# Patient Record
Sex: FEMALE | Race: ASIAN | NOT HISPANIC OR LATINO | Employment: UNEMPLOYED | URBAN - METROPOLITAN AREA
[De-identification: names, ages, dates, MRNs, and addresses within clinical notes are randomized per-mention and may not be internally consistent; named-entity substitution may affect disease eponyms.]

---

## 2020-09-20 ENCOUNTER — HOSPITAL ENCOUNTER (EMERGENCY)
Facility: CLINIC | Age: 40
Discharge: HOME | End: 2020-09-20
Attending: FAMILY MEDICINE | Admitting: FAMILY MEDICINE
Payer: COMMERCIAL

## 2020-09-20 VITALS
OXYGEN SATURATION: 96 % | HEART RATE: 96 BPM | WEIGHT: 123.6 LBS | TEMPERATURE: 98.9 F | SYSTOLIC BLOOD PRESSURE: 104 MMHG | DIASTOLIC BLOOD PRESSURE: 74 MMHG | RESPIRATION RATE: 18 BRPM

## 2020-09-20 DIAGNOSIS — A74.9 CHLAMYDIA INFECTION: ICD-10-CM

## 2020-09-20 DIAGNOSIS — N73.0 PID (ACUTE PELVIC INFLAMMATORY DISEASE): ICD-10-CM

## 2020-09-20 LAB
SPECIMEN SOURCE: ABNORMAL
WET PREP SPEC: ABNORMAL

## 2020-09-20 PROCEDURE — 25000125 ZZHC RX 250: Performed by: FAMILY MEDICINE

## 2020-09-20 PROCEDURE — 87210 SMEAR WET MOUNT SALINE/INK: CPT | Performed by: FAMILY MEDICINE

## 2020-09-20 PROCEDURE — 25000132 ZZH RX MED GY IP 250 OP 250 PS 637: Performed by: FAMILY MEDICINE

## 2020-09-20 PROCEDURE — 87591 N.GONORRHOEAE DNA AMP PROB: CPT | Performed by: FAMILY MEDICINE

## 2020-09-20 PROCEDURE — 96372 THER/PROPH/DIAG INJ SC/IM: CPT | Performed by: FAMILY MEDICINE

## 2020-09-20 PROCEDURE — 87177 OVA AND PARASITES SMEARS: CPT | Performed by: FAMILY MEDICINE

## 2020-09-20 PROCEDURE — 87491 CHLMYD TRACH DNA AMP PROBE: CPT | Performed by: FAMILY MEDICINE

## 2020-09-20 PROCEDURE — 99284 EMERGENCY DEPT VISIT MOD MDM: CPT | Mod: 25 | Performed by: FAMILY MEDICINE

## 2020-09-20 PROCEDURE — 87209 SMEAR COMPLEX STAIN: CPT | Performed by: FAMILY MEDICINE

## 2020-09-20 PROCEDURE — 25000128 H RX IP 250 OP 636: Performed by: FAMILY MEDICINE

## 2020-09-20 PROCEDURE — 87506 IADNA-DNA/RNA PROBE TQ 6-11: CPT | Performed by: FAMILY MEDICINE

## 2020-09-20 PROCEDURE — 99284 EMERGENCY DEPT VISIT MOD MDM: CPT | Mod: Z6 | Performed by: FAMILY MEDICINE

## 2020-09-20 RX ORDER — AZITHROMYCIN 250 MG/1
1000 TABLET, FILM COATED ORAL ONCE
Status: COMPLETED | OUTPATIENT
Start: 2020-09-20 | End: 2020-09-20

## 2020-09-20 RX ADMIN — AZITHROMYCIN 1000 MG: 250 TABLET, FILM COATED ORAL at 15:46

## 2020-09-20 RX ADMIN — LIDOCAINE HYDROCHLORIDE 250 MG: 10 INJECTION, SOLUTION EPIDURAL; INFILTRATION; INTRACAUDAL; PERINEURAL at 15:46

## 2020-09-20 SDOH — HEALTH STABILITY: MENTAL HEALTH: HOW OFTEN DO YOU HAVE A DRINK CONTAINING ALCOHOL?: NEVER

## 2020-09-20 ASSESSMENT — ENCOUNTER SYMPTOMS
NAUSEA: 1
DIARRHEA: 1
ABDOMINAL PAIN: 1
APPETITE CHANGE: 1

## 2020-09-20 NOTE — DISCHARGE INSTRUCTIONS
Discharge to home follow-up on your culture results in 2 days return if increased abdominal pain vomiting dehydration or fevers.

## 2020-09-20 NOTE — ED PROVIDER NOTES
ED Provider Note  [facility]      History     Chief Complaint   Patient presents with     Abdominal Pain     States she thinks she was exposed to possible clamydia. Complains of abd pain.     The history is provided by the patient and medical records.     Prema Valenzuela is a 40 year old female who presents to the Emergency Department with abdominal pain. Patient reports she has lower abdominal pain and thinks she has PID again. She states she has had a new partner for the past month. Patient reports she was seen at [facility] 2 weeks ago for similar pain and was treated for PID. Per patient's report she also had chlamydia and gonorrhea from her partner. She states she was treated and he was not and they have continued to engage in sexual activity. Patient reports she just finished her menstrual period and has continued to have spotting the past 2 days which is abnormal for her. She states she had nausea, diarrhea, and was dry-heaving last night. Patient has also not been able to get comfortable in bed and has had decreased appetite.    Past Medical History  History reviewed. No pertinent past medical history.  History reviewed. No pertinent surgical history.  No current outpatient medications on file.    Allergies   Allergen Reactions     Zofran [Ondansetron] Hives     Alcohol      Ibuprofen      Family History  Family History   Adopted: Yes   Problem Relation Age of Onset     Family History Negative No family hx of         pt is adopted     Social History   Social History     Tobacco Use     Smoking status: Current Every Day Smoker     Packs/day: 0.30     Years: 13.00     Pack years: 3.90     Types: Cigarettes     Smokeless tobacco: Never Used   Substance Use Topics     Alcohol use: Not Currently     Frequency: Never     Comment: rare     Drug use: No      Past medical history, past surgical history, medications, allergies, family history, and social history were reviewed with the patient.  No additional pertinent items.       Review of Systems   Constitutional: Positive for appetite change (decrease).   Gastrointestinal: Positive for abdominal pain (lower), diarrhea and nausea.   All other systems reviewed and are negative.    A complete review of systems was performed with pertinent positives and negatives noted in the HPI, and all other systems negative.    Physical Exam   BP: 104/74  Pulse: 109  Temp: 99.6  F (37.6  C)(pt denied cough,CP and SOB.)  Resp: 18  Weight: 56.1 kg (123 lb 9.6 oz)  SpO2: 100 %  Physical Exam  Constitutional:       General: She is not in acute distress.     Appearance: She is not diaphoretic.   HENT:      Head: Atraumatic.      Mouth/Throat:      Pharynx: No oropharyngeal exudate.   Eyes:      General: No scleral icterus.     Pupils: Pupils are equal, round, and reactive to light.   Cardiovascular:      Heart sounds: Normal heart sounds.   Pulmonary:      Effort: No respiratory distress.      Breath sounds: Normal breath sounds.   Abdominal:      General: Bowel sounds are normal.      Palpations: Abdomen is soft.      Tenderness: There is no abdominal tenderness.   Genitourinary:     Vagina: Vaginal discharge present.      Cervix: Cervical motion tenderness present.      Uterus: Tender.       Adnexa:         Right: Tenderness present. No mass.          Left: Tenderness present. No mass.     Musculoskeletal:         General: No tenderness.   Skin:     General: Skin is warm.      Findings: No rash.       ED Course      Procedures    Patient was given 250 IM Rocephin and 1 g of Zithromax p.o. she then became somewhat nauseated followed by several watery diarrheal stools she was able to take p.o. without difficulty we did send stool for stool cultures otherwise she will follow-up on her culture results in 2 days.         Assessments & Plan (with Medical Decision Making)       I have reviewed the nursing notes. I have reviewed the findings, diagnosis, plan and need for follow up  with the patient.    Patient with likely PID from reinfection likely chlamydia at this time was treated with antibiotics does have loose stools however does not appear to be grossly dehydrated was taking fluids and has a nonsurgical abdomen there were no rebound or significant guarding on examination she does have tenderness on pelvic examination consistent with PID.  Patient will be discharged to home with stool cultures as well as [facility] cultures pending will be following up with her primary MD in 2 days or return to the emergency room if she has any marked increase in pain or fevers.    Final diagnoses:   PID (acute pelvic inflammatory disease)   Chlamydia infection     ITami, am serving as a trained medical scribe to document services personally performed by Vivek Mejia MD, based on the provider's statements to me.      IVviek MD, was physically present and have reviewed and verified the accuracy of this note documented by Tami Fang    --  Vivek Mejia MD  [facility], [place], EMERGENCY DEPARTMENT  9/20/2020     Vivek Mejia MD  09/20/20 1921

## 2020-09-20 NOTE — ED AVS SNAPSHOT
[facility], [place], Emergency Department  [address]  [place] [address]  Phone:  [phone]  Fax:  [phone]                                    Prema Valenzuela   MRN: [id number]    Department:  [facility], Emergency Department   Date of Visit:  9/20/2020           After Visit Summary Signature Page    I have received my discharge instructions, and my questions have been answered. I have discussed any challenges I see with this plan with the nurse or doctor.    ..........................................................................................................................................  Patient/Patient Representative Signature      ..........................................................................................................................................  Patient Representative Print Name and Relationship to Patient    ..................................................               ................................................  Date                                   Time    ..........................................................................................................................................  Reviewed by Signature/Title    ...................................................              ..............................................  Date                                               Time          22EPIC Rev 08/18

## 2020-09-20 NOTE — ED NOTES
Pt has had three rounds of diarrhea since administration of ABx.  Pt feeling a little nauseus and would like to wait a few more minutes before discharge, Room tided a bit and MD notifed

## 2020-09-21 LAB
C COLI+JEJUNI+LARI FUSA STL QL NAA+PROBE: NOT DETECTED
C TRACH DNA SPEC QL NAA+PROBE: NEGATIVE
EC STX1 GENE STL QL NAA+PROBE: NOT DETECTED
EC STX2 GENE STL QL NAA+PROBE: NOT DETECTED
ENTERIC PATHOGEN COMMENT: NORMAL
N GONORRHOEA DNA SPEC QL NAA+PROBE: NEGATIVE
NOROV GI+II ORF1-ORF2 JNC STL QL NAA+PR: NOT DETECTED
O+P STL MICRO: NORMAL
O+P STL MICRO: NORMAL
RVA NSP5 STL QL NAA+PROBE: NOT DETECTED
SALMONELLA SP RPOD STL QL NAA+PROBE: NOT DETECTED
SHIGELLA SP+EIEC IPAH STL QL NAA+PROBE: NOT DETECTED
SPECIMEN SOURCE: NORMAL
V CHOL+PARA RFBL+TRKH+TNAA STL QL NAA+PR: NOT DETECTED
Y ENTERO RECN STL QL NAA+PROBE: NOT DETECTED

## 2020-09-21 NOTE — RESULT ENCOUNTER NOTE
Final result for both N. Gonorrhoeae PCR and Chlamydia Trachomatis PCR are NEGATIVE.  No treatment or change in treatment per [facility] ED Lab Result protocol.

## 2020-09-21 NOTE — RESULT ENCOUNTER NOTE
Final Enteric Bacteria and Virus Panel by [facility] Stool is NEGATIVE for Campylobacter group, Salmonella species, Shigella species, Shiga toxin 1 gene, Shiga toxin 2 gene, Vibrio group,  Yersinia enterocolitica,  Rotavirus A,  and Norovirus I and II.    No treatment or change in treatment per [facility] Lab Result protocol.

## 2022-08-10 ENCOUNTER — APPOINTMENT (OUTPATIENT)
Dept: ULTRASOUND IMAGING | Facility: CLINIC | Age: 42
End: 2022-08-10
Attending: INTERNAL MEDICINE
Payer: COMMERCIAL

## 2022-08-10 ENCOUNTER — HOSPITAL ENCOUNTER (EMERGENCY)
Facility: CLINIC | Age: 42
Discharge: HOME | End: 2022-08-10
Attending: INTERNAL MEDICINE | Admitting: INTERNAL MEDICINE
Payer: COMMERCIAL

## 2022-08-10 VITALS
RESPIRATION RATE: 18 BRPM | OXYGEN SATURATION: 96 % | HEART RATE: 74 BPM | WEIGHT: 101 LBS | TEMPERATURE: 99.1 F | BODY MASS INDEX: 19.83 KG/M2 | SYSTOLIC BLOOD PRESSURE: 124 MMHG | DIASTOLIC BLOOD PRESSURE: 88 MMHG | HEIGHT: 60 IN

## 2022-08-10 DIAGNOSIS — N83.202 CYSTS OF BOTH OVARIES: ICD-10-CM

## 2022-08-10 DIAGNOSIS — N93.9 VAGINAL BLEEDING: ICD-10-CM

## 2022-08-10 DIAGNOSIS — D25.9 UTERINE LEIOMYOMA, UNSPECIFIED LOCATION: ICD-10-CM

## 2022-08-10 DIAGNOSIS — N83.201 CYSTS OF BOTH OVARIES: ICD-10-CM

## 2022-08-10 LAB
ABO/RH(D): NORMAL
ALBUMIN SERPL-MCNC: 3.6 G/DL (ref 3.4–5)
ALBUMIN UR-MCNC: NEGATIVE MG/DL
ALP SERPL-CCNC: 67 U/L (ref 40–150)
ALT SERPL W P-5'-P-CCNC: 14 U/L (ref 0–50)
AMORPH CRY #/AREA URNS HPF: ABNORMAL /HPF
ANION GAP SERPL CALCULATED.3IONS-SCNC: 3 MMOL/L (ref 3–14)
ANTIBODY SCREEN: NEGATIVE
APPEARANCE UR: ABNORMAL
AST SERPL W P-5'-P-CCNC: 10 U/L (ref 0–45)
B-HCG SERPL-ACNC: <1 IU/L (ref 0–5)
BASOPHILS # BLD AUTO: 0 10E3/UL (ref 0–0.2)
BASOPHILS NFR BLD AUTO: 1 %
BILIRUB SERPL-MCNC: 0.8 MG/DL (ref 0.2–1.3)
BILIRUB UR QL STRIP: NEGATIVE
BUN SERPL-MCNC: 10 MG/DL (ref 7–30)
CALCIUM SERPL-MCNC: 9 MG/DL (ref 8.5–10.1)
CHLORIDE BLD-SCNC: 108 MMOL/L (ref 94–109)
CO2 SERPL-SCNC: 28 MMOL/L (ref 20–32)
COLOR UR AUTO: ABNORMAL
CREAT SERPL-MCNC: 0.65 MG/DL (ref 0.52–1.04)
EOSINOPHIL # BLD AUTO: 0.1 10E3/UL (ref 0–0.7)
EOSINOPHIL NFR BLD AUTO: 1 %
ERYTHROCYTE [DISTWIDTH] IN BLOOD BY AUTOMATED COUNT: 13.1 % (ref 10–15)
GFR SERPL CREATININE-BSD FRML MDRD: >90 ML/MIN/1.73M2
GLUCOSE BLD-MCNC: 262 MG/DL (ref 70–99)
GLUCOSE UR STRIP-MCNC: 500 MG/DL
HCG UR QL: NEGATIVE
HCT VFR BLD AUTO: 41.5 % (ref 35–47)
HGB BLD-MCNC: 14.2 G/DL (ref 11.7–15.7)
HGB UR QL STRIP: NEGATIVE
IMM GRANULOCYTES # BLD: 0 10E3/UL
IMM GRANULOCYTES NFR BLD: 1 %
INR PPP: 1.07 (ref 0.85–1.15)
KETONES UR STRIP-MCNC: NEGATIVE MG/DL
LEUKOCYTE ESTERASE UR QL STRIP: NEGATIVE
LYMPHOCYTES # BLD AUTO: 1.6 10E3/UL (ref 0.8–5.3)
LYMPHOCYTES NFR BLD AUTO: 19 %
MCH RBC QN AUTO: 30 PG (ref 26.5–33)
MCHC RBC AUTO-ENTMCNC: 34.2 G/DL (ref 31.5–36.5)
MCV RBC AUTO: 88 FL (ref 78–100)
MONOCYTES # BLD AUTO: 0.4 10E3/UL (ref 0–1.3)
MONOCYTES NFR BLD AUTO: 5 %
MUCOUS THREADS #/AREA URNS LPF: PRESENT /LPF
NEUTROPHILS # BLD AUTO: 6.3 10E3/UL (ref 1.6–8.3)
NEUTROPHILS NFR BLD AUTO: 73 %
NITRATE UR QL: NEGATIVE
NRBC # BLD AUTO: 0 10E3/UL
NRBC BLD AUTO-RTO: 0 /100
PH UR STRIP: 6.5 [PH] (ref 5–7)
PLATELET # BLD AUTO: 281 10E3/UL (ref 150–450)
POTASSIUM BLD-SCNC: 3.7 MMOL/L (ref 3.4–5.3)
PROT SERPL-MCNC: 7 G/DL (ref 6.8–8.8)
RBC # BLD AUTO: 4.73 10E6/UL (ref 3.8–5.2)
RBC URINE: 1 /HPF
SODIUM SERPL-SCNC: 139 MMOL/L (ref 133–144)
SP GR UR STRIP: 1.02 (ref 1–1.03)
SPECIMEN EXPIRATION DATE: NORMAL
SQUAMOUS EPITHELIAL: 2 /HPF
UROBILINOGEN UR STRIP-MCNC: NORMAL MG/DL
WBC # BLD AUTO: 8.4 10E3/UL (ref 4–11)
WBC URINE: <1 /HPF

## 2022-08-10 PROCEDURE — 80053 COMPREHEN METABOLIC PANEL: CPT | Performed by: INTERNAL MEDICINE

## 2022-08-10 PROCEDURE — 84702 CHORIONIC GONADOTROPIN TEST: CPT | Performed by: INTERNAL MEDICINE

## 2022-08-10 PROCEDURE — 93976 VASCULAR STUDY: CPT

## 2022-08-10 PROCEDURE — 85025 COMPLETE CBC W/AUTO DIFF WBC: CPT | Performed by: INTERNAL MEDICINE

## 2022-08-10 PROCEDURE — 81025 URINE PREGNANCY TEST: CPT | Performed by: INTERNAL MEDICINE

## 2022-08-10 PROCEDURE — 99285 EMERGENCY DEPT VISIT HI MDM: CPT | Mod: 25 | Performed by: INTERNAL MEDICINE

## 2022-08-10 PROCEDURE — 81003 URINALYSIS AUTO W/O SCOPE: CPT | Performed by: INTERNAL MEDICINE

## 2022-08-10 PROCEDURE — 82040 ASSAY OF SERUM ALBUMIN: CPT | Performed by: INTERNAL MEDICINE

## 2022-08-10 PROCEDURE — 36415 COLL VENOUS BLD VENIPUNCTURE: CPT | Performed by: INTERNAL MEDICINE

## 2022-08-10 PROCEDURE — 99284 EMERGENCY DEPT VISIT MOD MDM: CPT | Mod: 25 | Performed by: INTERNAL MEDICINE

## 2022-08-10 PROCEDURE — 76815 OB US LIMITED FETUS(S): CPT | Performed by: INTERNAL MEDICINE

## 2022-08-10 PROCEDURE — 85610 PROTHROMBIN TIME: CPT | Performed by: INTERNAL MEDICINE

## 2022-08-10 PROCEDURE — 86850 RBC ANTIBODY SCREEN: CPT | Performed by: INTERNAL MEDICINE

## 2022-08-10 PROCEDURE — 76815 OB US LIMITED FETUS(S): CPT | Mod: 26 | Performed by: INTERNAL MEDICINE

## 2022-08-10 ASSESSMENT — ENCOUNTER SYMPTOMS
FEVER: 0
HEADACHES: 0
SHORTNESS OF BREATH: 0
NECK STIFFNESS: 0
NERVOUS/ANXIOUS: 1
CONSTIPATION: 1
ARTHRALGIAS: 0
NAUSEA: 1
COLOR CHANGE: 0
DIFFICULTY URINATING: 0
ABDOMINAL PAIN: 1
EYE REDNESS: 0
CONFUSION: 0

## 2022-08-10 ASSESSMENT — ACTIVITIES OF DAILY LIVING (ADL)
ADLS_ACUITY_SCORE: 35
ADLS_ACUITY_SCORE: 33

## 2022-08-10 NOTE — ED TRIAGE NOTES
"\"I'm having pregnancy symptoms and spotting.\" Pt is 41yo F bibs c/o spotting x4 days and possible pregnancy. She denies all other complaints. Missed period on 7/21/22.  Pt c/o anxiety and exhibits pressured speech and provides many details about hx of pregnancy and miscarriage. She appears in stable condition, pt is oriented to ED evaluation process.      Triage Assessment     Row Name 08/10/22 2033       Triage Assessment (Adult)    Airway WDL WDL       Respiratory WDL    Respiratory WDL WDL       Skin Circulation/Temperature WDL    Skin Circulation/Temperature WDL WDL       Cardiac WDL    Cardiac WDL WDL       Peripheral/Neurovascular WDL    Peripheral Neurovascular WDL WDL       Cognitive/Neuro/Behavioral WDL    Cognitive/Neuro/Behavioral WDL WDL              "

## 2022-08-10 NOTE — ED NOTES
"Pt states she already voided in ED and didn't collect a sample. Pt was informed a urine sample has been ordered by MD. pt questioning why and states she \"peed already, it was really yellow, and she doesn't need that as she has been peeing all day.\"    Pt also had an IV placed for blood draw and pt requested that the IV be removed as it was hurting her despite her stating she has a high pain tolerance and she stated she did not need it.    Pt given underwear and several pads as well.  "
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
## 2022-08-11 NOTE — DISCHARGE INSTRUCTIONS
Please make an appointment to follow up with Your OB Gyn as soon as possible even if entirely better.

## 2022-08-18 ENCOUNTER — NURSE TRIAGE (OUTPATIENT)
Dept: NURSING | Facility: CLINIC | Age: 42
End: 2022-08-18

## 2022-08-18 NOTE — TELEPHONE ENCOUNTER
Pt is upset, went into ED for missed period, thinks she may be pregnant  Since 7/21, pt noticed that she did not have her period,   Skipped period in July, started noticing some period cramps, sometimes experiences cramp for a day and no bleeding but notice cramping for 4 or 5 days and still no period bleeding but felt constipated and still does up to this day.   Then on a Friday while shaving legs, pt notices on her womb, pubic area a shaped like lemon was there. Pt has her left tube tie, states that tube does get inflamed and have pain sometimes. Have history of fibroids and cysts.     Currently have No bleeding, has miscarriages before. Since ED visit, pt has been feeling paranoid, if she is pregnant and is 8 weeks, where is the baby growing in womb or tube? And if she should be having symptoms if baby is growing in the tube or else where.    Pt was advise in the ED to follow up with OB. Pt has phone appointment scheduled 8/25/22, earliest that she can get in. Pt states she did home pregnancy test that is negative yet have heard stories from friends who tests negative and is pregnant. Pt is paranoid and stressed stating she is having all the pregnancy symptoms.      Triage disposition to UC/ED. Advised to keep OB appointment and seek ED if pt symptoms worsen before appointment. Pt verbalized understanding. Call back if needs reassurance or have more questions/concerns.      Bridgette Hall RN, BSN  8/18/2022 at 4:40 PM  [facility]                Reason for Disposition    MILD constant abdominal pain (e.g., doesn't interfere with normal activities) and present > 2 hours    Additional Information    Negative: Passed out (i.e., fainted, collapsed and was not responding)    Negative: Shock suspected (e.g., cold/pale/clammy skin, too weak to stand, low BP, rapid pulse)    Negative: Difficult to awaken or acting confused (e.g., disoriented, slurred speech)    Negative: Sounds like a life-threatening  emergency to the triager    Negative: Abdominal pain and pregnant 20 or more weeks    Negative: MODERATE-SEVERE abdominal pain    Negative: SEVERE vaginal bleeding (e.g., soaking 2 pads or tampons per hour and present 2 or more hours; 1 menstrual cup every 2 hours)    Negative: MODERATE vaginal bleeding (e.g., soaking 1 pad or tampon per hour and present > 6 hours; 1 menstrual cup every 6 hours)    Negative: MODERATE vaginal bleeding and pregnant > 12 weeks    Negative: Passed tissue (e.g., gray-white)    Negative: Vomiting and contains red blood or black ('coffee ground') material    Negative: Shoulder pain    Protocols used: PREGNANCY - ABDOMINAL PAIN LESS THAN 20 WEEKS EGA-A-OH

## 2022-08-25 ENCOUNTER — TELEPHONE (OUTPATIENT)
Dept: OBGYN | Facility: CLINIC | Age: 42
End: 2022-08-25

## 2022-08-25 NOTE — TELEPHONE ENCOUNTER
Pt called yesterday at around 4:00 PM. Stated that she was worried as pt believes she is currently pregnant and was feeling as if her hips were moving, fallopian tube growing and her womb moving to make room for baby. Pt was crying and worried, I offered her to speak with FNA but she refused as she had an appt with us today (8/25/2022) and would talk with someone then. Pt just looking for someone to listen to her.

## 2022-10-27 ENCOUNTER — PATIENT OUTREACH (OUTPATIENT)
Dept: ONCOLOGY | Facility: CLINIC | Age: 42
End: 2022-10-27

## 2022-10-27 ENCOUNTER — TRANSCRIBE ORDERS (OUTPATIENT)
Dept: OTHER | Age: 42
End: 2022-10-27

## 2022-10-27 DIAGNOSIS — N83.8 COMPLEX CYST OF UTERINE ADNEXA: Primary | ICD-10-CM

## 2022-10-27 NOTE — PROGRESS NOTES
New Patient Hematology / Oncology Nurse Navigator Note     Referral Date: 10/27/22    Referring provider:   Brian Flanagan MBBS    [address] [place] [address]    [phone] (Work)    [phone] (Fax)      Referring Clinic/Organization: [facility]     Referred to: GynOnc    Requested provider (if applicable): First available - did not specify     Evaluation for : Complex cyst of uterine adnexa       Clinical History (per Nurse review of records provided):      10/25 Office Visit OBGYN    9/4 US through [facility] showing:  Impression    1.  Indeterminate 12.8 cm left ovarian cystic lesion redemonstrated, similar in size to previous examination. Arterial and venous flow was documented.   2.  Follow-up GYN and/or MRI evaluation recommended.     8/29  (normal at 15.4)-- BOOKMARKED     8/29 PAP/HPV-- BOOKMARKED     10/26 MRI Pelvis done through [facility]? report not yet viewable in CE    Clinical Assessment / Barriers to Care (Per Nurse):  Pt lives in [place], [place]      Records Location: Care Everywhere     Records Needed:   Imaging from [facility]    Additional testing needed prior to consult:   Planning to repeat MRI 10/28. She was not able to tolerate MRI the first time which is why report is not viewable. She is trying again Friday with pre meds     Referral updates and Plan:   OUTGOING CALL to pt:  Introduced my role as nurse navigator with [facility] Hematology/Oncology dept and that we have recd the referral for dx of ovarian cysts from Dr Flanagan  Pt confirms they are aware of the referral and ready to schedule  Provided contact information if future questions arise.     -Transferred pt to NPS line [phone] to schedule appt per scheduling instructions. Will plan to arrange appointment with Dr. Ortega 11/2 per pt time preference (confirmed with pt this date/location works)      Nayely Wade, ARELIN, RN, PHN, OCN  Hematology/Oncology Nurse Navigator  [facility]  [phone]

## 2022-10-31 NOTE — PROGRESS NOTES
RECORDS STATUS - ALL OTHER DIAGNOSIS    Complex cyst of uterine adnexa  RECORDS RECEIVED FROM: [facility]/ [facility]/ [facility]    DATE RECEIVED: 11/2/2022    Action    Action Taken 11/1/2022 8:11AM SHANNAN     I called [facility] IMG Dept [phone] - they will push the three US images from 2022 to PACS    I faxed over a request form for imaging to      8:49AM SHANNAN   I resolved imaging in PACS      NOTES STATUS DETAILS   OFFICE NOTE from referring provider     DISCHARGE REPORT from the ER     CLINICAL TRIAL TREATMENTS TO DATE     LABS     PATHOLOGY REPORTS     ANYTHING RELATED TO DIAGNOSIS Complete CE- Labs last updated on 10/25/2022    GENONOMIC TESTING     TYPE:     IMAGING (NEED IMAGES & REPORT)     CT SCANS     MRI     MAMMO     ULTRASOUND Complete    Complete - [facility]  Requested- [facility]  US pelvis Complete ([facility]) 9/4/2022, 8/27/2022    US Venous Lower Extremity ([facility]) 9/4/2022    US Pelvis 8/10/2022    US Pelvis Complete ([facility]) 12/28/2021, 10/21/2020   PET

## 2022-11-02 ENCOUNTER — PRE VISIT (OUTPATIENT)
Dept: ONCOLOGY | Facility: CLINIC | Age: 42
End: 2022-11-02

## 2022-11-02 ENCOUNTER — ONCOLOGY VISIT (OUTPATIENT)
Dept: ONCOLOGY | Facility: CLINIC | Age: 42
End: 2022-11-02
Attending: OBSTETRICS & GYNECOLOGY
Payer: COMMERCIAL

## 2022-11-02 VITALS
DIASTOLIC BLOOD PRESSURE: 75 MMHG | WEIGHT: 111.6 LBS | OXYGEN SATURATION: 98 % | RESPIRATION RATE: 6 BRPM | TEMPERATURE: 98.9 F | HEART RATE: 64 BPM | BODY MASS INDEX: 21.8 KG/M2 | SYSTOLIC BLOOD PRESSURE: 118 MMHG

## 2022-11-02 DIAGNOSIS — N83.8 COMPLEX CYST OF UTERINE ADNEXA: ICD-10-CM

## 2022-11-02 PROCEDURE — G0463 HOSPITAL OUTPT CLINIC VISIT: HCPCS

## 2022-11-02 PROCEDURE — 99205 OFFICE O/P NEW HI 60 MIN: CPT | Performed by: OBSTETRICS & GYNECOLOGY

## 2022-11-02 NOTE — PROGRESS NOTES
Consult Notes on Referred Patient    Date: 2022       Dr. Yaneth Velasquez MD  No address on file       RE: Prema Valenzuela  : 1980  YUNG: 2022      I had the pleasure of seeing your patient Prema Valenzuela here at the Gynecologic Cancer Clinic at the [facility] on 2022.  As you know she is a very pleasant 42 year old woman with a recent diagnosis of pelvic mass.  Given these findings she was subsequently sent to the Gynecologic Cancer Clinic for new patient consultation.   G0 history of miscarriage never had a confirmed pregnancy test.  Patient has been diagnosed with a complex left ovarian cyst back in August and she was 10 cm and agreed to 13 most recent ultrasound showed 12.8 cm.   15.4 she is otherwise eating and drinking well does have since  left side lower quadrant pain with assistance.  Otherwise no change in urinary or bowel habits she does have irregular periods every 28 days however did not have a period between  and September and recently started having regular cycles again.  She of note also has a cyst in her cervix was believed to be a nabothian cyst.  Of note has is normal recent normal Pap smear normal cytology and HPV high-risk negative.  No B symptoms.    Past Medical History:  Patient states she has type 2 diabetes and is not on any medication.  She did have some episodes of arrhythmias over the last several years with some vasovagal episodes when she gets up.  This has never been formally evaluated.  She does also report that she with those episodes sometimes has some chest pain.  She however has not had them in the last year or so.    Past Surgical History:  Laparoscopic ovarian cystectomy.  Adenoid removal as a child.    Health Maintenance:  Health Maintenance Due   Topic Date Due     NICOTINE/TOBACCO CESSATION COUNSELING Q 1 YR  Never done     YEARLY PREVENTIVE VISIT  Never done     ADVANCE CARE PLANNING  Never done     HEPATITIS  B IMMUNIZATION (1 of 3 - 3-dose series) Never done     Pneumococcal Vaccine: Pediatrics (0 to 5 Years) and At-Risk Patients (6 to 64 Years) (1 - PCV) Never done     HIV SCREENING  Never done     HEPATITIS C SCREENING  Never done     PAP  01/21/2008     COVID-19 Vaccine (2 - Moderna series) 06/17/2021     DTAP/TDAP/TD IMMUNIZATION (3 - Td or Tdap) 08/05/2021     PHQ-2 (once per calendar year)  Never done     INFLUENZA VACCINE (1) Never done       No history of abnormal Pap smears.  Never had a colonoscopy.      Current Medications:     currently has no medications in their medication list.       Allergies:     Alcohol, Ciprofloxacin, Ondansetron, Alcohol gel base, Ibuprofen, Oxycodone, Sulfa drugs, and Aspirin        Social History:     Social History     Tobacco Use     Smoking status: Every Day     Packs/day: 0.30     Years: 13.00     Pack years: 3.90     Types: Cigarettes     Smokeless tobacco: Never   Substance Use Topics     Alcohol use: Not Currently     Comment: rare       History   Drug Use No           Family History:     Patient is adopted and does not know any family history.    Family History   Adopted: Yes   Problem Relation Age of Onset     Family History Negative No family hx of         pt is adopted         Physical Exam:     /75 (BP Location: Right arm, Patient Position: Sitting, Cuff Size: Adult Regular)   Pulse 64   Temp 98.9  F (37.2  C) (Oral)   Resp (!) 6   Wt 50.6 kg (111 lb 9.6 oz)   SpO2 98%   BMI 21.80 kg/m    Body mass index is 21.8 kg/m .    General Appearance: healthy and alert, no distress     Musculoskeletal: extremities non tender and without edema    Skin: no lesions or rashes     Neurological: normal gait, no gross defects     Psychiatric: appropriate mood and affect                               Hematological: normal cervical, supraclavicular and inguinal lymph nodes     Gastrointestinal:       abdomen soft, mild tender left lower quadrant, non-distended, no organomegaly  or masses    Genitourinary: External genitalia and urethral meatus appears normal.  Vagina is smooth without nodularity or masses.  Cervix has a 1 cm cyst is protruding from the cervix otherwise normal-appearing cervix..  Bimanual exam reveal no masses, nodularity or fullness.  Recto-vaginal exam confirms these findings.      Assessment:    Prema Valenzuela is a 42 year old woman with a new diagnosis of pelvic mass.     A total of 60 minutes was spent with the patient, 40 minutes of which were spent in counseling the patient and/or treatment planning.      1. Pelvic mass  2. Cervical cyst  3. Type 2 DM  4. Possible Vasovagal episodes    I discussed with the patient we will await the schedule MRI.  And discuss afterwards what kind of surgery we need to proceed with.  There is a chance that this cyst has ruptured and revolved resolved on her ovary.  Regardless she would prefer to have that cyst on the cervix removed under anesthesia and we will minimum plan to do that.  The patient agrees with this plan of note she will need clearance by our colleagues in anesthesia givenher prior episodes of what appears of vasovagal episodes and may be some arrhythmias.  Patient agrees with this plan very appreciative of care all questions were answered.      Thank you for allowing us to participate in the care of your patient.         Sincerely,    Donnie Ortega MD, MS    Department of Obstetrics and Gynecology   Division of Gynecologic Oncology   [facility]  Phone: [phone]    CC  Patient Care Team:  No Ref-Primary, Physician as PCP - General  Panchito Rowe RT (Respiratory Therapy)  Donnie Ortega MD as MD (Gynecologic Oncology)  SELF, REFERRED

## 2022-11-02 NOTE — LETTER
2022         RE: Prema Valenzuela  [address] [place] [address]        Dear Colleague,    Thank you for referring your patient, Prema Valenzuela, to the [facility]. Please see a copy of my visit note below.                            Consult Notes on Referred Patient    Date: 2022       Dr. Yaneth Velasquez MD  No address on file       RE: Prema Valenzuela  : 1980  YUNG: 2022      I had the pleasure of seeing your patient Prema Valenzuela here at the Gynecologic Cancer Clinic at the [facility] on 2022.  As you know she is a very pleasant 42 year old woman with a recent diagnosis of pelvic mass.  Given these findings she was subsequently sent to the Gynecologic Cancer Clinic for new patient consultation.   G0 history of miscarriage never had a confirmed pregnancy test.  Patient has been diagnosed with a complex left ovarian cyst back in August and she was 10 cm and agreed to 13 most recent ultrasound showed 12.8 cm.   15.4 she is otherwise eating and drinking well does have since  left side lower quadrant pain with assistance.  Otherwise no change in urinary or bowel habits she does have irregular periods every 28 days however did not have a period between  and September and recently started having regular cycles again.  She of note also has a cyst in her cervix was believed to be a nabothian cyst.  Of note has is normal recent normal Pap smear normal cytology and HPV high-risk negative.  No B symptoms.    Past Medical History:  Patient states she has type 2 diabetes and is not on any medication.  She did have some episodes of arrhythmias over the last several years with some vasovagal episodes when she gets up.  This has never been formally evaluated.  She does also report that she with those episodes sometimes has some chest pain.  She however has not had them in the last year or so.    Past Surgical History:  Laparoscopic ovarian cystectomy.   Adenoid removal as a child.    Health Maintenance:  Health Maintenance Due   Topic Date Due     NICOTINE/TOBACCO CESSATION COUNSELING Q 1 YR  Never done     YEARLY PREVENTIVE VISIT  Never done     ADVANCE CARE PLANNING  Never done     HEPATITIS B IMMUNIZATION (1 of 3 - 3-dose series) Never done     Pneumococcal Vaccine: Pediatrics (0 to 5 Years) and At-Risk Patients (6 to 64 Years) (1 - PCV) Never done     HIV SCREENING  Never done     HEPATITIS C SCREENING  Never done     PAP  01/21/2008     COVID-19 Vaccine (2 - Moderna series) 06/17/2021     DTAP/TDAP/TD IMMUNIZATION (3 - Td or Tdap) 08/05/2021     PHQ-2 (once per calendar year)  Never done     INFLUENZA VACCINE (1) Never done       No history of abnormal Pap smears.  Never had a colonoscopy.      Current Medications:     currently has no medications in their medication list.       Allergies:     Alcohol, Ciprofloxacin, Ondansetron, Alcohol gel base, Ibuprofen, Oxycodone, Sulfa drugs, and Aspirin        Social History:     Social History     Tobacco Use     Smoking status: Every Day     Packs/day: 0.30     Years: 13.00     Pack years: 3.90     Types: Cigarettes     Smokeless tobacco: Never   Substance Use Topics     Alcohol use: Not Currently     Comment: rare       History   Drug Use No           Family History:     Patient is adopted and does not know any family history.    Family History   Adopted: Yes   Problem Relation Age of Onset     Family History Negative No family hx of         pt is adopted         Physical Exam:     /75 (BP Location: Right arm, Patient Position: Sitting, Cuff Size: Adult Regular)   Pulse 64   Temp 98.9  F (37.2  C) (Oral)   Resp (!) 6   Wt 50.6 kg (111 lb 9.6 oz)   SpO2 98%   BMI 21.80 kg/m    Body mass index is 21.8 kg/m .    General Appearance: healthy and alert, no distress     Musculoskeletal: extremities non tender and without edema    Skin: no lesions or rashes     Neurological: normal gait, no gross  defects     Psychiatric: appropriate mood and affect                               Hematological: normal cervical, supraclavicular and inguinal lymph nodes     Gastrointestinal:       abdomen soft, mild tender left lower quadrant, non-distended, no organomegaly or masses    Genitourinary: External genitalia and urethral meatus appears normal.  Vagina is smooth without nodularity or masses.  Cervix has a 1 cm cyst is protruding from the cervix otherwise normal-appearing cervix..  Bimanual exam reveal no masses, nodularity or fullness.  Recto-vaginal exam confirms these findings.      Assessment:    Prema Valenzuela is a 42 year old woman with a new diagnosis of pelvic mass.     A total of 60 minutes was spent with the patient, 40 minutes of which were spent in counseling the patient and/or treatment planning.      1. Pelvic mass  2. Cervical cyst  3. Type 2 DM  4. Possible Vasovagal episodes    I discussed with the patient we will await the schedule MRI.  And discuss afterwards what kind of surgery we need to proceed with.  There is a chance that this cyst has ruptured and revolved resolved on her ovary.  Regardless she would prefer to have that cyst on the cervix removed under anesthesia and we will minimum plan to do that.  The patient agrees with this plan of note she will need clearance by our colleagues in anesthesia givenher prior episodes of what appears of vasovagal episodes and may be some arrhythmias.  Patient agrees with this plan very appreciative of care all questions were answered.      Thank you for allowing us to participate in the care of your patient.         Sincerely,    Donnie Ortega MD, MS    Department of Obstetrics and Gynecology   Division of Gynecologic Oncology   [facility]  Phone: [phone]    CC  Patient Care Team:  No Ref-Primary, Physician as PCP - General  Panchito Rowe RT (Respiratory Therapy)  Donnie Ortega MD as MD (Gynecologic  Oncology)  SELF, REFERRED        Again, thank you for allowing me to participate in the care of your patient.        Sincerely,        Donnie Ortega MD

## 2022-11-02 NOTE — NURSING NOTE
Oncology Rooming Note    November 2, 2022 4:11 PM   Prema Valenzuela is a 42 year old female who presents for:    Chief Complaint   Patient presents with     Oncology Clinic Visit     New patient: Complex cyst of uterine adnexa     Initial Vitals: /75 (BP Location: Right arm, Patient Position: Sitting, Cuff Size: Adult Regular)   Pulse 64   Temp 98.9  F (37.2  C) (Oral)   Resp (!) 6   Wt 50.6 kg (111 lb 9.6 oz)   SpO2 98%   BMI 21.80 kg/m   Estimated body mass index is 21.8 kg/m  as calculated from the following:    Height as of 8/10/22: 1.524 m (5').    Weight as of this encounter: 50.6 kg (111 lb 9.6 oz). Body surface area is 1.46 meters squared.  Data Unavailable Comment: Data Unavailable   No LMP recorded.  Allergies reviewed: Yes  Medications reviewed: Yes    Medications: Medication refills not needed today.  Pharmacy name entered into [facility]: [id number] - [place], [place] - [facility] [facility] [address]    Clinical concerns: New patient-states that she was unable to get the MRI last week. Reports abdominal pain.        Annita De La Vega LPN November 2, 2022 4:12 PM

## 2022-11-02 NOTE — LETTER
Date:November 3, 2022      Patient was self referred, no letter generated. Do not send.        [facility] Health Information

## 2022-11-30 ENCOUNTER — ONCOLOGY VISIT (OUTPATIENT)
Dept: ONCOLOGY | Facility: CLINIC | Age: 42
End: 2022-11-30
Attending: OBSTETRICS & GYNECOLOGY
Payer: COMMERCIAL

## 2022-11-30 VITALS
RESPIRATION RATE: 16 BRPM | TEMPERATURE: 99 F | WEIGHT: 114.8 LBS | HEART RATE: 66 BPM | DIASTOLIC BLOOD PRESSURE: 76 MMHG | OXYGEN SATURATION: 97 % | SYSTOLIC BLOOD PRESSURE: 116 MMHG | BODY MASS INDEX: 22.42 KG/M2

## 2022-11-30 DIAGNOSIS — N83.8 COMPLEX CYST OF UTERINE ADNEXA: Primary | ICD-10-CM

## 2022-11-30 PROCEDURE — G0463 HOSPITAL OUTPT CLINIC VISIT: HCPCS | Performed by: OBSTETRICS & GYNECOLOGY

## 2022-11-30 PROCEDURE — 99214 OFFICE O/P EST MOD 30 MIN: CPT | Performed by: OBSTETRICS & GYNECOLOGY

## 2022-11-30 PROCEDURE — G0463 HOSPITAL OUTPT CLINIC VISIT: HCPCS

## 2022-11-30 ASSESSMENT — PAIN SCALES - GENERAL: PAINLEVEL: MILD PAIN (3)

## 2022-11-30 NOTE — LETTER
11/30/2022      RE: Prema Valenzuela  [address] [place] [address]       x        Donnie Ortega MD

## 2022-11-30 NOTE — PATIENT INSTRUCTIONS
US and follow up visit with MD in 3 months     Scheduling will reach out and assist with these appointments     Have a beautiful day    Randall

## 2022-11-30 NOTE — NURSING NOTE
Oncology Rooming Note    November 30, 2022 2:47 PM   Prema Valenzuela is a 42 year old female who presents for:    Chief Complaint   Patient presents with     Oncology Clinic Visit     MRI review and follow up      Initial Vitals: /76   Pulse 66   Temp 99  F (37.2  C) (Oral)   Resp 16   Wt 52.1 kg (114 lb 12.8 oz)   SpO2 97%   BMI 22.42 kg/m   Estimated body mass index is 22.42 kg/m  as calculated from the following:    Height as of 8/10/22: 1.524 m (5').    Weight as of this encounter: 52.1 kg (114 lb 12.8 oz). Body surface area is 1.49 meters squared.  Mild Pain (3) Comment: Data Unavailable   No LMP recorded.  Allergies reviewed: Yes  Medications reviewed: Yes    Medications: Medication refills not needed today.  Pharmacy name entered into [facility]:    [id number] - [place], [place] - [address] [id number] - [place], [place] - [address] Jessica Alvarado, [facility]

## 2022-11-30 NOTE — LETTER
11/30/2022         RE: Prema Valenzuela  [address]  [place] [facility] [address]        Dear Colleague,    Thank you for referring your patient, Prema Valenzuela, to the [facility]. Please see a copy of my visit note below.    x        Again, thank you for allowing me to participate in the care of your patient.        Sincerely,        Donnie Ortega MD

## 2023-01-02 NOTE — PROGRESS NOTES
"            Follow Up Notes on Referred Patient    Date: 2022       Dr. Yaneth Velasquez MD  No address on file       RE: Prema Valenzuela  : 1980  YUNG: 2022    Dear Dr. Yaneth Velasquez:    Prema Valenzuela is a 42 year old woman with a diagnosis of diagnosis of pelvic mass. No new symptoms since I have her seen last.      Past Medical History:  Patient states she has type 2 diabetes and is not on any medication.  She did have some episodes of arrhythmias over the last several years with some vasovagal episodes when she gets up.  This has never been formally evaluated.  She does also report that she with those episodes sometimes has some chest pain.  She however has not had them in the last year or so.     Past Surgical History:  Laparoscopic ovarian cystectomy.  Adenoid removal as a child.    Health Maintenance Due   Topic Date Due     NICOTINE/TOBACCO CESSATION COUNSELING Q 1 YR  Never done     YEARLY PREVENTIVE VISIT  Never done     ADVANCE CARE PLANNING  Never done     HEPATITIS B IMMUNIZATION (1 of 3 - 3-dose series) Never done     Pneumococcal Vaccine: Pediatrics (0 to 5 Years) and At-Risk Patients (6 to 64 Years) (1 - PCV) Never done     HIV SCREENING  Never done     HEPATITIS C SCREENING  Never done     PAP  2008     COVID-19 Vaccine (2 - Moderna series) 2021     DTAP/TDAP/TD IMMUNIZATION (3 - Td or Tdap) 2021     INFLUENZA VACCINE (1) Never done     PHQ-2 (once per calendar year)  Never done       Current Medications:     No current outpatient medications on file.         Allergies:        Allergies   Allergen Reactions     Alcohol Anaphylaxis and Rash     \" defect, not really and allergy but pt does not tolerate alcohol, skin turns red.\"       Ciprofloxacin Hives     Per patient report, hives, no anaphylaxis  Per patient report, hives, no anaphylaxis       Ondansetron Hives and Rash     Alcohol Gel Base Other (See Comments)     Gets red     Ibuprofen Hives     PN: LW " Reaction: HIVES       Oxycodone      Sulfa Drugs      Aspirin Rash        Social History:     Social History     Tobacco Use     Smoking status: Every Day     Packs/day: 0.30     Years: 13.00     Pack years: 3.90     Types: Cigarettes     Smokeless tobacco: Never   Substance Use Topics     Alcohol use: Not Currently     Comment: rare       History   Drug Use No         Family History:       Family History   Adopted: Yes   Problem Relation Age of Onset     Family History Negative No family hx of         pt is adopted         Physical Exam:     /76   Pulse 66   Temp 99  F (37.2  C) (Oral)   Resp 16   Wt 52.1 kg (114 lb 12.8 oz)   SpO2 97%   BMI 22.42 kg/m    Body mass index is 22.42 kg/m .    General Appearance: healthy and alert, no distress     Musculoskeletal: extremities non tender and without edema    Skin: no lesions or rashes     Neurological: normal gait, no gross defects     Psychiatric: appropriate mood and affect                                     Assessment:    Prema Valenzuela is a 42 year old woman with a diagnosis of diagnosis of pelvic mass.     A total of 30 minutes was spent with the patient, 25 minutes of which were spent in counseling the patient and/or treatment planning.      1. Pelvic mass  2. Cervical cyst  3. Type 2 DM  4. Possible Vasovagal episodes    We did review the MRI findings suggesting smaller cyst of 8.4 cm likely hydrosalpinges, without any aggressive features. The cervix was normal in the MRI. We will have a short follow up with a pelvic US in 3 month. The patient agrees with this plan.  Patient agrees with this plan very appreciative of care all questions were answered.      Donnie Ortega MD, MS    Department of Obstetrics and Gynecology   Division of Gynecologic Oncology   [facility]  Phone: [phone]        CC  Patient Care Team:  No Ref-Primary, Physician as PCP - General  Panchito Rowe RT (Respiratory Therapy)  Shannon  Donnie RODRIGUEZ MD as MD (Gynecologic Oncology)  Donnie Ortega MD as Assigned Cancer Care Provider  SELF, REFERRED